# Patient Record
Sex: MALE | Race: WHITE | Employment: FULL TIME | URBAN - METROPOLITAN AREA
[De-identification: names, ages, dates, MRNs, and addresses within clinical notes are randomized per-mention and may not be internally consistent; named-entity substitution may affect disease eponyms.]

---

## 2024-09-19 ENCOUNTER — TELEPHONE (OUTPATIENT)
Dept: FAMILY MEDICINE CLINIC | Facility: CLINIC | Age: 29
End: 2024-09-19

## 2024-09-19 DIAGNOSIS — R73.01 IMPAIRED FASTING GLUCOSE: Primary | ICD-10-CM

## 2024-09-19 NOTE — TELEPHONE ENCOUNTER
L5-S1 Microdiscectomy on 10/14/24 with Dr. Menjivar @ [facility]    H&P- Completed 09/20/2024 with Dr. Tha Hooks  Labs- FBS (144), Alk phos (42), Bilirubin (2), A1C (6.1), MRSA neg, all other labs WNL  EKG- WNL  X-ray- WNL

## 2024-09-20 ENCOUNTER — EKG ENCOUNTER (OUTPATIENT)
Dept: LAB | Facility: HOSPITAL | Age: 29
End: 2024-09-20
Attending: FAMILY MEDICINE
Payer: COMMERCIAL

## 2024-09-20 ENCOUNTER — HOSPITAL ENCOUNTER (OUTPATIENT)
Dept: GENERAL RADIOLOGY | Facility: HOSPITAL | Age: 29
Discharge: HOME | End: 2024-09-20
Attending: FAMILY MEDICINE
Payer: COMMERCIAL

## 2024-09-20 ENCOUNTER — OFFICE VISIT (OUTPATIENT)
Dept: FAMILY MEDICINE CLINIC | Facility: CLINIC | Age: 29
End: 2024-09-20
Payer: COMMERCIAL

## 2024-09-20 VITALS
DIASTOLIC BLOOD PRESSURE: 70 MMHG | HEART RATE: 70 BPM | TEMPERATURE: 98 F | RESPIRATION RATE: 16 BRPM | SYSTOLIC BLOOD PRESSURE: 110 MMHG | WEIGHT: 215 LBS | HEIGHT: 74 IN | BODY MASS INDEX: 27.59 KG/M2 | OXYGEN SATURATION: 98 %

## 2024-09-20 DIAGNOSIS — Z01.812 PRE-OPERATIVE LABORATORY EXAMINATION: ICD-10-CM

## 2024-09-20 DIAGNOSIS — Z01.818 PREOP EXAMINATION: Primary | ICD-10-CM

## 2024-09-20 DIAGNOSIS — Z51.81 ENCOUNTER FOR MONITORING SUBOXONE MAINTENANCE THERAPY: ICD-10-CM

## 2024-09-20 DIAGNOSIS — Z79.899 ENCOUNTER FOR MONITORING SUBOXONE MAINTENANCE THERAPY: ICD-10-CM

## 2024-09-20 DIAGNOSIS — Z01.818 PREOP EXAMINATION: ICD-10-CM

## 2024-09-20 DIAGNOSIS — E10.9 TYPE 1 DIABETES MELLITUS WITHOUT COMPLICATION (HCC): ICD-10-CM

## 2024-09-20 DIAGNOSIS — M48.062 NEUROGENIC CLAUDICATION DUE TO LUMBAR SPINAL STENOSIS: Primary | ICD-10-CM

## 2024-09-20 DIAGNOSIS — R73.01 IMPAIRED FASTING GLUCOSE: ICD-10-CM

## 2024-09-20 DIAGNOSIS — Z87.898 HISTORY OF POSTOPERATIVE NAUSEA AND VOMITING: ICD-10-CM

## 2024-09-20 LAB
ALBUMIN SERPL-MCNC: 4.7 G/DL (ref 3.2–4.8)
ALBUMIN/GLOB SERPL: 1.7 {RATIO} (ref 1–2)
ALP LIVER SERPL-CCNC: 42 U/L
ALT SERPL-CCNC: 44 U/L
ANION GAP SERPL CALC-SCNC: 4 MMOL/L (ref 0–18)
APTT PPP: 27.4 SECONDS (ref 23–36)
AST SERPL-CCNC: 23 U/L (ref ?–34)
ATRIAL RATE: 65 BPM
BASOPHILS # BLD AUTO: 0.01 X10(3) UL (ref 0–0.2)
BASOPHILS NFR BLD AUTO: 0.1 %
BILIRUB SERPL-MCNC: 2 MG/DL (ref 0.3–1.2)
BILIRUB UR QL: NEGATIVE
BUN BLD-MCNC: 13 MG/DL (ref 9–23)
BUN/CREAT SERPL: 13.8 (ref 10–20)
CALCIUM BLD-MCNC: 10.1 MG/DL (ref 8.7–10.4)
CHLORIDE SERPL-SCNC: 107 MMOL/L (ref 98–112)
CLARITY UR: CLEAR
CO2 SERPL-SCNC: 29 MMOL/L (ref 21–32)
COLOR UR: YELLOW
CREAT BLD-MCNC: 0.94 MG/DL
DEPRECATED RDW RBC AUTO: 39.7 FL (ref 35.1–46.3)
EGFRCR SERPLBLD CKD-EPI 2021: 114 ML/MIN/1.73M2 (ref 60–?)
EOSINOPHIL # BLD AUTO: 0.09 X10(3) UL (ref 0–0.7)
EOSINOPHIL NFR BLD AUTO: 1.2 %
ERYTHROCYTE [DISTWIDTH] IN BLOOD BY AUTOMATED COUNT: 12.1 % (ref 11–15)
EST. AVERAGE GLUCOSE BLD GHB EST-MCNC: 128 MG/DL (ref 68–126)
FASTING STATUS PATIENT QL REPORTED: NO
GLOBULIN PLAS-MCNC: 2.8 G/DL (ref 2–3.5)
GLUCOSE BLD-MCNC: 144 MG/DL (ref 70–99)
GLUCOSE UR-MCNC: NORMAL MG/DL
HBA1C MFR BLD: 6.1 % (ref ?–5.7)
HCT VFR BLD AUTO: 43.1 %
HGB BLD-MCNC: 15.1 G/DL
HGB UR QL STRIP.AUTO: NEGATIVE
IMM GRANULOCYTES # BLD AUTO: 0.02 X10(3) UL (ref 0–1)
IMM GRANULOCYTES NFR BLD: 0.3 %
INR BLD: 0.96 (ref 0.8–1.2)
KETONES UR-MCNC: NEGATIVE MG/DL
LEUKOCYTE ESTERASE UR QL STRIP.AUTO: NEGATIVE
LYMPHOCYTES # BLD AUTO: 2.2 X10(3) UL (ref 1–4)
LYMPHOCYTES NFR BLD AUTO: 29.2 %
MCH RBC QN AUTO: 31.1 PG (ref 26–34)
MCHC RBC AUTO-ENTMCNC: 35 G/DL (ref 31–37)
MCV RBC AUTO: 88.9 FL
MONOCYTES # BLD AUTO: 0.71 X10(3) UL (ref 0.1–1)
MONOCYTES NFR BLD AUTO: 9.4 %
NEUTROPHILS # BLD AUTO: 4.51 X10 (3) UL (ref 1.5–7.7)
NEUTROPHILS # BLD AUTO: 4.51 X10(3) UL (ref 1.5–7.7)
NEUTROPHILS NFR BLD AUTO: 59.8 %
NITRITE UR QL STRIP.AUTO: NEGATIVE
OSMOLALITY SERPL CALC.SUM OF ELEC: 293 MOSM/KG (ref 275–295)
P AXIS: 33 DEGREES
P-R INTERVAL: 146 MS
PH UR: 7 [PH] (ref 5–8)
PLATELET # BLD AUTO: 254 10(3)UL (ref 150–450)
POTASSIUM SERPL-SCNC: 4.5 MMOL/L (ref 3.5–5.1)
PROT SERPL-MCNC: 7.5 G/DL (ref 5.7–8.2)
PROT UR-MCNC: NEGATIVE MG/DL
PROTHROMBIN TIME: 13.4 SECONDS (ref 11.6–14.8)
Q-T INTERVAL: 396 MS
QRS DURATION: 100 MS
QTC CALCULATION (BEZET): 411 MS
R AXIS: 50 DEGREES
RBC # BLD AUTO: 4.85 X10(6)UL
SODIUM SERPL-SCNC: 140 MMOL/L (ref 136–145)
SP GR UR STRIP: 1.02 (ref 1–1.03)
T AXIS: 28 DEGREES
UROBILINOGEN UR STRIP-ACNC: NORMAL
VENTRICULAR RATE: 65 BPM
WBC # BLD AUTO: 7.5 X10(3) UL (ref 4–11)

## 2024-09-20 PROCEDURE — 99204 OFFICE O/P NEW MOD 45 MIN: CPT | Performed by: FAMILY MEDICINE

## 2024-09-20 PROCEDURE — 93005 ELECTROCARDIOGRAM TRACING: CPT

## 2024-09-20 PROCEDURE — 85610 PROTHROMBIN TIME: CPT

## 2024-09-20 PROCEDURE — 80053 COMPREHEN METABOLIC PANEL: CPT

## 2024-09-20 PROCEDURE — 36415 COLL VENOUS BLD VENIPUNCTURE: CPT

## 2024-09-20 PROCEDURE — 83036 HEMOGLOBIN GLYCOSYLATED A1C: CPT

## 2024-09-20 PROCEDURE — 85730 THROMBOPLASTIN TIME PARTIAL: CPT

## 2024-09-20 PROCEDURE — 81003 URINALYSIS AUTO W/O SCOPE: CPT

## 2024-09-20 PROCEDURE — 71046 X-RAY EXAM CHEST 2 VIEWS: CPT | Performed by: FAMILY MEDICINE

## 2024-09-20 PROCEDURE — 93010 ELECTROCARDIOGRAM REPORT: CPT | Performed by: INTERNAL MEDICINE

## 2024-09-20 PROCEDURE — 87081 CULTURE SCREEN ONLY: CPT

## 2024-09-20 PROCEDURE — 85025 COMPLETE CBC W/AUTO DIFF WBC: CPT

## 2024-09-20 RX ORDER — BUPRENORPHINE AND NALOXONE 2; .5 MG/1; MG/1
1 FILM, SOLUBLE BUCCAL; SUBLINGUAL DAILY
COMMUNITY
Start: 2023-04-05 | End: 2024-09-20

## 2024-09-20 RX ORDER — INSULIN LISPRO 100 [IU]/ML
30 INJECTION, SOLUTION INTRAVENOUS; SUBCUTANEOUS DAILY
COMMUNITY
Start: 2024-08-06 | End: 2025-05-03

## 2024-09-20 RX ORDER — INSULIN PMP CART,AUT,G6/7,CNTR
1 EACH SUBCUTANEOUS AS DIRECTED
COMMUNITY
Start: 2024-04-15

## 2024-09-20 NOTE — TELEPHONE ENCOUNTER
Julieth please review:    Labs- FBS (144), Alk phos (42), Bilirubin (2), A1C (6.1), MRSA neg, all other labs WNL    EKG- WNL    X-ray- WNL    OK for surgery?

## 2024-09-20 NOTE — H&P
[facility] PRE-OP CLINIC [place]    PRE-OP NOTE    HPI:   I have been consulted by Dr. Menjivar to see Diallo Chappell 29 year old male for a preoperative evaluation and medical clearance. He has a long history of worsening severe degenerative arthritis and lumbar spinal stenosis . Patient is to have a L5-S1 microdiscectomy by Dr. Menjivar  on 10/14/2024.     Pt suffers significant pain and loss of function.     He has no cardiopulmonary symptoms.      He has no history of BISI or DVT. Denies tobacco use.       Family History   Problem Relation Age of Onset    Other (Other) Maternal Grandmother         lung cancer    Other (Other) Paternal Grandmother         lung cancer    Other (Other) Paternal Grandfather         lung cancer      No current outpatient medications on file.     Past Medical History:    Insulin pump in place    PONV (postoperative nausea and vomiting)    Type 1 diabetes (HCC)     Past Surgical History:   Procedure Laterality Date    Colonoscopy  2022    with endoscopy    Elbow surgery Right      Social History     Socioeconomic History    Marital status: Not on file     Spouse name: Not on file    Number of children: Not on file    Years of education: Not on file    Highest education level: Not on file   Occupational History    Not on file   Tobacco Use    Smoking status: Never     Passive exposure: Never    Smokeless tobacco: Never   Vaping Use    Vaping status: Former   Substance and Sexual Activity    Alcohol use: Yes     Comment: socially    Drug use: Never    Sexual activity: Not on file   Other Topics Concern    Not on file   Social History Narrative    Not on file     Social Determinants of Health     Financial Resource Strain: Not on file   Food Insecurity: Not on file   Transportation Needs: Not on file   Physical Activity: Not on file   Stress: Not on file   Social Connections: Not on file   Housing Stability: Not on file       REVIEW OF SYSTEMS:   CONSTITUTIONAL:  Denies unusual weight gain/loss,  fever, chills  EENT:  Eyes:  Denies eye pain, visual loss, blurred vision, double vision. Ears, Nose, Throat:  Denies congestion, runny nose or sore throat.  INTEGUMENTARY:  Denies rashes, itching, skin lesion,   CARDIOVASCULAR:  Denies DVT. Denies chest pain, palpitations, edema, dyspnea  RESPIRATORY: Denies  BISI ,Denies shortness of breath, wheezing, cough  GASTROINTESTINAL:  Denies abdominal pain, nausea, vomiting, constipation, diarrhea, or blood in stool.  MUSCULOSKELETAL: severe pain to his low back as noted above  NEUROLOGICAL:  Denies headache, seizures, dizziness, syncope, paralysis, ataxia,  HEMATOLOGIC:  Denies anemia, bleeding or bruising.  LYMPHATICS:  Denies enlarged nodes   PSYCHIATRIC:  Denies depression or anxiety.  ENDOCRINOLOGIC: DM 2 YES,   ALLERGIES:  Denies allergic response, history of asthma, hives,     EXAM:   /70 (BP Location: Left arm)   Pulse 70   Temp 98 °F (36.7 °C) (Temporal)   Resp 16   Ht 6' 2\" (1.88 m)   Wt 215 lb (97.5 kg)   SpO2 98%   BMI 27.60 kg/m²  Estimated body mass index is 27.6 kg/m² as calculated from the following:    Height as of this encounter: 6' 2\" (1.88 m).    Weight as of this encounter: 215 lb (97.5 kg).   Vital signs reviewed.Appears stated age, well groomed.  Physical Exam:  GEN:  Patient is alert, awake and oriented, well developed, well nourished, no apparent distress.  HEENT:  Head:  Normocephalic, atraumatic  Nose: patent, no nasal discharge  NECK: Supple, no CLAD, no carotid bruit, no thyromegaly.  SKIN: No rashes, no skin lesion, no bruising, good turgor.  HEART:  Regular rate and rhythm, no murmurs, rubs or gallops.  LUNGS: Clear to auscultation bilterally, no rales/rhonchi/wheezing.  CHEST: No tenderness.  ABDOMEN:  Soft, nondistended, nontender,  no masses, no hepatosplenomegaly.  BACK: low lumbar tenderness ,   EXTREMITIES:  No edema, no cyanosis, no clubbing,   NEURO:  No focal deficit, speech fluent, he has an antalgic  gait, strength and  tone, sensory intact      No results found for: \"WBC\", \"HGB\", \"HCT\", \"PLT\", \"CREATSERUM\", \"BUN\", \"NA\", \"K\", \"CL\", \"CO2\", \"GLU\", \"CA\", \"ALB\", \"ALKPHO\", \"BILT\", \"TP\", \"AST\", \"ALT\", \"PTT\", \"INR\", \"PT\", \"T4F\", \"TSH\", \"TSHREFLEX\", \"JONATHAN\", \"LIP\", \"GGT\", \"PSA\", \"DDIMER\", \"ESRML\", \"ESRPF\", \"CRP\", \"BNP\", \"MG\", \"PHOS\", \"TROP\", \"CK\", \"CKMB\", \"JAMARCUS\", \"RPR\", \"B12\", \"ETOH\", \"POCGLU\"    No results found.          ASSESSMENT AND PLAN:   Diallo Chappell is a 29 year old male, with a hx of severe degenerative arthritis and lumbar spinal stenosis  who presents for a pre-operative physical exam. Patient is to have a L5-S1 microdiscectomy  by Dr. Menjivar  on 10/14/2024.      ICD-10-CM    1. Neurogenic claudication due to lumbar spinal stenosis  M48.062       2. Type 1 diabetes mellitus without complication (HCC)  E10.9       3. History of postoperative nausea and vomiting  Z87.898       4. Patient on Suboxone maintenance therapy  Z51.81     Z79.899       5. Preop examination  Z01.818 CBC With Differential With Platelet     Comp Metabolic Panel (14)     EKG 12 Lead     MSSA and MRSA Culture Screen     Prothrombin Time (PT)     PTT, Activated     Urinalysis with Culture Reflex     XR CHEST PA + LAT CHEST (CPT=71046)      6. Pre-operative laboratory examination  Z01.812 CBC With Differential With Platelet     Comp Metabolic Panel (14)     EKG 12 Lead     MSSA and MRSA Culture Screen     Prothrombin Time (PT)     PTT, Activated     Urinalysis with Culture Reflex     XR CHEST PA + LAT CHEST (CPT=71046)           ECG and labs are pending review     Preoperative Risk Stratification: There are no decompensated medical conditions. ASA classification 2    Medical history:  High risk surgery (vascular, thoracic, intra-peritoneal): No  CAD: No  CHF: No  Stroke: No  DM on insulin: YES   Serum Creatinine >2 mg/dl: No  Patient has an RCRI score that is  low risk and is at low risk for major cardiac event in the perioperative period.     Patient is medically  optimized and has an acceptable risk of surgery and may proceed with surgery as planned.     PLAN:    Patient to discontinue medications and supplements with anticoagulation properties as per instruction.        Postoperative Recommendations:    Anticoagulation / DVT prophylaxis: SCDs, early ambulation.  GI protection: Protonix  Incentive Spirometry   Telemetry as needed  CPAP/O2 as needed   DM: QID glucoscans and sliding scale insulin as needed   Renal protection: (hydration / NSAID and ACE/ARB avoidance)   Cognitive protection: (minimize narcotics, benzodiazepines, scopolamine)     Pain management and Physical therapy as per Orthopedic service.   Home Health as needed    Thank you for the opportunity to care for your patient and to assist in managing the postoperative course.        Tha Hooks DO  9/20/2024  9:42 AM

## 2024-09-23 NOTE — TELEPHONE ENCOUNTER
LM for Dr. Menjivar office Arben PA to call back about Omnipod- if he will just be turning it off, and about Naloxone that he is still on daily, if that will interfere with anesthesia/post-op pain meds.

## 2024-09-23 NOTE — TELEPHONE ENCOUNTER
Spoke to Arben we will need to call Endo for insulin pump/omnipod perioperative recommendations.    We will need to find out which doc Rx's suboxone and figure out a tapering dose so he will not be on it come DOS 10/15 not sure if PCP prescribes, No PCP on file.

## 2024-09-23 NOTE — TELEPHONE ENCOUNTER
Spoke to patient, he will take off Omnipod before he goes in for surgery and put on a new one once he gets home. No need to call Endo.    He will call [facility] which is where he gets suboxone from to ask for a tapering schedule so he is not on it anymore come DOS 10/14/24. He will let me know schedule once he confirms with [facility].

## 2024-10-04 NOTE — TELEPHONE ENCOUNTER
Went over test results with patient, he is clear for surgery per Dr. Hooks. He states he did contact [facility] [employer] for tapering suboxone, he has already started tapering with plans to be completely off for surgery.